# Patient Record
Sex: FEMALE | Race: OTHER | Employment: UNEMPLOYED | URBAN - METROPOLITAN AREA
[De-identification: names, ages, dates, MRNs, and addresses within clinical notes are randomized per-mention and may not be internally consistent; named-entity substitution may affect disease eponyms.]

---

## 2017-01-10 RX ORDER — LEVOTHYROXINE SODIUM 0.12 MG/1
TABLET ORAL
Qty: 30 TABLET | Refills: 0 | Status: SHIPPED | OUTPATIENT
Start: 2017-01-10 | End: 2017-02-07

## 2017-01-10 NOTE — TELEPHONE ENCOUNTER
Last TSH 10/2016    Notes Recorded by Chalino Grider MD on 10/2/2016 at 9:07 PM  i'd lower the synthroid to 125daily  #30 3r  kevin TSH then   Otherwise ok

## 2017-02-04 ENCOUNTER — APPOINTMENT (OUTPATIENT)
Dept: LAB | Age: 52
End: 2017-02-04
Attending: FAMILY MEDICINE
Payer: COMMERCIAL

## 2017-02-04 DIAGNOSIS — R79.89 ABNORMAL TSH: ICD-10-CM

## 2017-02-04 LAB — TSI SER-ACNC: 0.44 MIU/ML (ref 0.35–5.5)

## 2017-02-04 PROCEDURE — 36415 COLL VENOUS BLD VENIPUNCTURE: CPT

## 2017-02-04 PROCEDURE — 84443 ASSAY THYROID STIM HORMONE: CPT

## 2017-02-07 RX ORDER — LEVOTHYROXINE SODIUM 0.12 MG/1
TABLET ORAL
Qty: 30 TABLET | Refills: 0 | Status: SHIPPED | OUTPATIENT
Start: 2017-02-07 | End: 2017-03-10

## 2017-03-10 RX ORDER — LEVOTHYROXINE SODIUM 0.12 MG/1
TABLET ORAL
Qty: 30 TABLET | Refills: 0 | Status: SHIPPED | OUTPATIENT
Start: 2017-03-10 | End: 2017-03-15

## 2017-03-15 ENCOUNTER — TELEPHONE (OUTPATIENT)
Dept: INTERNAL MEDICINE CLINIC | Facility: CLINIC | Age: 52
End: 2017-03-15

## 2017-03-15 RX ORDER — LEVOTHYROXINE SODIUM 0.12 MG/1
TABLET ORAL
Qty: 30 TABLET | Refills: 9 | Status: SHIPPED | OUTPATIENT
Start: 2017-03-15 | End: 2017-03-30

## 2017-03-30 RX ORDER — LEVOTHYROXINE SODIUM 0.12 MG/1
TABLET ORAL
Qty: 90 TABLET | Refills: 2 | Status: SHIPPED | OUTPATIENT
Start: 2017-03-30 | End: 2017-05-16

## 2017-05-16 ENCOUNTER — OFFICE VISIT (OUTPATIENT)
Dept: INTERNAL MEDICINE CLINIC | Facility: CLINIC | Age: 52
End: 2017-05-16

## 2017-05-16 VITALS
WEIGHT: 138 LBS | TEMPERATURE: 98 F | SYSTOLIC BLOOD PRESSURE: 104 MMHG | HEART RATE: 93 BPM | DIASTOLIC BLOOD PRESSURE: 60 MMHG | RESPIRATION RATE: 16 BRPM | OXYGEN SATURATION: 98 % | BODY MASS INDEX: 25 KG/M2

## 2017-05-16 DIAGNOSIS — E03.9 HYPOTHYROIDISM, UNSPECIFIED TYPE: ICD-10-CM

## 2017-05-16 DIAGNOSIS — R10.2 PELVIC PAIN: ICD-10-CM

## 2017-05-16 DIAGNOSIS — Z12.31 VISIT FOR SCREENING MAMMOGRAM: ICD-10-CM

## 2017-05-16 DIAGNOSIS — H10.9 CONJUNCTIVITIS OF RIGHT EYE, UNSPECIFIED CONJUNCTIVITIS TYPE: Primary | ICD-10-CM

## 2017-05-16 PROCEDURE — 99213 OFFICE O/P EST LOW 20 MIN: CPT | Performed by: FAMILY MEDICINE

## 2017-05-16 RX ORDER — GENTAMICIN SULFATE 3 MG/ML
2 SOLUTION/ DROPS OPHTHALMIC 3 TIMES DAILY
Qty: 5 ML | Refills: 0 | Status: SHIPPED | OUTPATIENT
Start: 2017-05-16 | End: 2017-09-14

## 2017-05-16 RX ORDER — LEVOTHYROXINE SODIUM 0.12 MG/1
125 TABLET ORAL
Qty: 90 TABLET | Refills: 1 | Status: SHIPPED | OUTPATIENT
Start: 2017-05-16 | End: 2017-09-12

## 2017-05-16 NOTE — PROGRESS NOTES
HPI:    Patient ID: Stacia Pisano is a 49year old female.     HPI  Here for red eye on the R for 2 months now  Had mucous 2 mo ago   Vision is overall ok     Grandson had it as well    No itch   Is less than before though as far as redness     Would l

## 2017-05-18 ENCOUNTER — OFFICE VISIT (OUTPATIENT)
Dept: OBGYN CLINIC | Facility: CLINIC | Age: 52
End: 2017-05-18

## 2017-05-18 VITALS
WEIGHT: 137 LBS | BODY MASS INDEX: 25.21 KG/M2 | DIASTOLIC BLOOD PRESSURE: 70 MMHG | SYSTOLIC BLOOD PRESSURE: 102 MMHG | HEIGHT: 62 IN

## 2017-05-18 DIAGNOSIS — R10.2 PELVIC PAIN: Primary | ICD-10-CM

## 2017-05-18 DIAGNOSIS — Z87.42 HISTORY OF ABNORMAL CERVICAL PAP SMEAR: ICD-10-CM

## 2017-05-18 PROCEDURE — 87591 N.GONORRHOEAE DNA AMP PROB: CPT | Performed by: NURSE PRACTITIONER

## 2017-05-18 PROCEDURE — 87491 CHLMYD TRACH DNA AMP PROBE: CPT | Performed by: NURSE PRACTITIONER

## 2017-05-18 PROCEDURE — 88175 CYTOPATH C/V AUTO FLUID REDO: CPT | Performed by: NURSE PRACTITIONER

## 2017-05-18 PROCEDURE — 99203 OFFICE O/P NEW LOW 30 MIN: CPT | Performed by: NURSE PRACTITIONER

## 2017-05-18 PROCEDURE — 87624 HPV HI-RISK TYP POOLED RSLT: CPT | Performed by: NURSE PRACTITIONER

## 2017-05-18 NOTE — PROGRESS NOTES
Gyne note       S: patient is a 47year old yo  here for a 1 -2 year history of right sided pelvic pain. Only the last 2-3 weeks has it become more frequent, intense, and radiating to her back. Pain is up to an 8/10, and patient is using tylenol.  Pa Future  Pelvic US    2. History of abnormal cervical Pap smear  - ThinPrep Pap with HPV Reflex, Chlamydia/GC;  Future    Will obtain records from possible colposcopy

## 2017-05-24 NOTE — PROGRESS NOTES
Quick Note:    Pap, Gc/ Chl, HPV all normal/ negative.  Patient notified, encouraged to keep follow up appointment for [address].  ______

## 2017-05-25 ENCOUNTER — TELEPHONE (OUTPATIENT)
Dept: OBGYN CLINIC | Facility: CLINIC | Age: 52
End: 2017-05-25

## 2017-05-25 NOTE — TELEPHONE ENCOUNTER
[facility] faxed copy medical records request from [address] for [facility]  Faxed medical records request to [facility] office at [phone]  Original request in [facility] office

## 2017-06-05 ENCOUNTER — OFFICE VISIT (OUTPATIENT)
Dept: OBGYN CLINIC | Facility: CLINIC | Age: 52
End: 2017-06-05

## 2017-06-05 ENCOUNTER — APPOINTMENT (OUTPATIENT)
Dept: OBGYN CLINIC | Facility: CLINIC | Age: 52
End: 2017-06-05

## 2017-06-05 DIAGNOSIS — R10.2 PELVIC PAIN: Primary | ICD-10-CM

## 2017-06-05 PROCEDURE — 76830 TRANSVAGINAL US NON-OB: CPT | Performed by: OBSTETRICS & GYNECOLOGY

## 2017-06-05 NOTE — PROGRESS NOTES
Quick Note:    Transvaginal ultrasound for right pelvic pain  Normal ultrasound  Uterus: 4.2 x 5.4 x 6.8 cm  Endometrium 4mm  Small 0.89 x 1.3 x 1.3 cm intramural fibroid  Right ovary: 1.6 x 2.3 x 2.3  Left ovary 1.0 x 1.6 x 1.9 cm  No free fluid  ______

## 2017-06-05 NOTE — PROGRESS NOTES
Reviewed ultrasound with pt  Normal findings    Will observe  Keep annual exam visit  If persistant pain, to follow up prn

## 2017-06-08 ENCOUNTER — TELEPHONE (OUTPATIENT)
Dept: OBGYN CLINIC | Facility: CLINIC | Age: 52
End: 2017-06-08

## 2017-09-09 NOTE — TELEPHONE ENCOUNTER
Records reviewed from 11/2015 colposcopy- results were benign for cervical biopsy and ECC. Results to be scanned.

## 2017-09-14 ENCOUNTER — OFFICE VISIT (OUTPATIENT)
Dept: INTERNAL MEDICINE CLINIC | Facility: CLINIC | Age: 52
End: 2017-09-14

## 2017-09-14 VITALS
SYSTOLIC BLOOD PRESSURE: 110 MMHG | HEIGHT: 62 IN | RESPIRATION RATE: 13 BRPM | HEART RATE: 82 BPM | DIASTOLIC BLOOD PRESSURE: 78 MMHG | TEMPERATURE: 99 F | BODY MASS INDEX: 25.03 KG/M2 | WEIGHT: 136 LBS | OXYGEN SATURATION: 95 %

## 2017-09-14 DIAGNOSIS — M17.0 BILATERAL PRIMARY OSTEOARTHRITIS OF KNEE: ICD-10-CM

## 2017-09-14 DIAGNOSIS — J01.90 ACUTE RHINOSINUSITIS: Primary | ICD-10-CM

## 2017-09-14 DIAGNOSIS — E03.9 HYPOTHYROIDISM, UNSPECIFIED TYPE: ICD-10-CM

## 2017-09-14 DIAGNOSIS — Z91.09 ENVIRONMENTAL ALLERGIES: ICD-10-CM

## 2017-09-14 PROCEDURE — 99214 OFFICE O/P EST MOD 30 MIN: CPT | Performed by: INTERNAL MEDICINE

## 2017-09-14 RX ORDER — AZITHROMYCIN 250 MG/1
TABLET, FILM COATED ORAL
Qty: 6 TABLET | Refills: 0 | Status: SHIPPED | OUTPATIENT
Start: 2017-09-14 | End: 2017-11-21 | Stop reason: ALTCHOICE

## 2017-09-14 RX ORDER — AZELASTINE 1 MG/ML
1 SPRAY, METERED NASAL 2 TIMES DAILY
Qty: 30 ML | Refills: 0 | Status: SHIPPED | OUTPATIENT
Start: 2017-09-14 | End: 2017-11-21 | Stop reason: ALTCHOICE

## 2017-09-14 NOTE — PROGRESS NOTES
Annabel Vallejo is a 47year old female. HPI:   Patient presents with:  URI: x6 weeks/ clear d/c  Patient presents with acute complaint of upper respiratory symptoms. Symptoms going on for six weeks. Clear sputum/discharge.   Has been 24.87 kg/m²   GENERAL: Alert and oriented, well developed, well nourished,in no apparent distress  SKIN: no rashes,no suspicious lesions  HEENT: atraumatic, PERRLA, EOMI, normal lid and conjunctiva.   Bilateral sinus tenderness  NECK: supple, no jvd, no thy

## 2017-09-14 NOTE — PATIENT INSTRUCTIONS
- Start antibiotic today  - Restart daily allergy medication (over the counter). Options include cetirizine, loratadine, and fexofenadine.    - Use nasal spray twice daily for the next week. It was a pleasure seeing you in the clinic today.   Thank louie

## 2017-09-15 RX ORDER — LEVOTHYROXINE SODIUM 0.12 MG/1
TABLET ORAL
Qty: 90 TABLET | Refills: 0 | Status: SHIPPED | OUTPATIENT
Start: 2017-09-15 | End: 2018-03-04

## 2017-11-21 ENCOUNTER — OFFICE VISIT (OUTPATIENT)
Dept: INTERNAL MEDICINE CLINIC | Facility: CLINIC | Age: 52
End: 2017-11-21

## 2017-11-21 VITALS
HEART RATE: 78 BPM | WEIGHT: 132 LBS | OXYGEN SATURATION: 99 % | BODY MASS INDEX: 24.92 KG/M2 | RESPIRATION RATE: 12 BRPM | DIASTOLIC BLOOD PRESSURE: 82 MMHG | TEMPERATURE: 98 F | HEIGHT: 61 IN | SYSTOLIC BLOOD PRESSURE: 130 MMHG

## 2017-11-21 DIAGNOSIS — Z00.00 LABORATORY EXAMINATION ORDERED AS PART OF A ROUTINE GENERAL MEDICAL EXAMINATION: ICD-10-CM

## 2017-11-21 DIAGNOSIS — E03.9 HYPOTHYROIDISM, UNSPECIFIED TYPE: ICD-10-CM

## 2017-11-21 DIAGNOSIS — Z00.00 ROUTINE GENERAL MEDICAL EXAMINATION AT A HEALTH CARE FACILITY: Primary | ICD-10-CM

## 2017-11-21 DIAGNOSIS — L60.3 CRACKED NAILS: ICD-10-CM

## 2017-11-21 DIAGNOSIS — Z11.59 NEED FOR HEPATITIS C SCREENING TEST: ICD-10-CM

## 2017-11-21 DIAGNOSIS — Z23 NEED FOR INFLUENZA VACCINATION: ICD-10-CM

## 2017-11-21 PROCEDURE — 99396 PREV VISIT EST AGE 40-64: CPT | Performed by: FAMILY MEDICINE

## 2017-11-21 PROCEDURE — 99213 OFFICE O/P EST LOW 20 MIN: CPT | Performed by: FAMILY MEDICINE

## 2017-11-21 PROCEDURE — 90471 IMMUNIZATION ADMIN: CPT | Performed by: FAMILY MEDICINE

## 2017-11-21 PROCEDURE — 90686 IIV4 VACC NO PRSV 0.5 ML IM: CPT | Performed by: FAMILY MEDICINE

## 2017-11-21 NOTE — PROGRESS NOTES
HPI:   Radha Paredes is a 47year old female who presents for a complete physical exam. Symptoms: denies discharge, itching, burning or dysuria, periods are regular, flow is 3 days. Patient complains of her finger nails cracking.  Pt would 11/21/2017    Wt Readings from Last 6 Encounters:  11/21/17 : 132 lb  09/14/17 : 136 lb  05/18/17 : 137 lb  05/16/17 : 138 lb  10/20/16 : 141 lb 8 oz  01/25/16 : 137 lb    Body mass index is 24.94 kg/m².        Lab Results  Component Value Date dysuria, vaginal discharge or itching,periods regular   MUSCULOSKELETAL: denies back pain, OA of the knees,  NEURO: denies headaches, hx of bell's Palsy  PSYCHE: denies depression or anxiety  HEMATOLOGIC: denies hx of anemia  ENDOCRINE: thyroid history  AL care facility  (primary encounter diagnosis)  Laboratory examination ordered as part of a routine general medical examination  Need for hepatitis c screening test  Need for influenza vaccination      Orders Placed This Encounter      CBC With Differential

## 2017-11-28 ENCOUNTER — HOSPITAL ENCOUNTER (OUTPATIENT)
Dept: MAMMOGRAPHY | Age: 52
Discharge: HOME | End: 2017-11-28
Attending: FAMILY MEDICINE
Payer: COMMERCIAL

## 2017-11-28 ENCOUNTER — LAB ENCOUNTER (OUTPATIENT)
Dept: LAB | Age: 52
End: 2017-11-28
Attending: FAMILY MEDICINE
Payer: COMMERCIAL

## 2017-11-28 DIAGNOSIS — Z00.00 LABORATORY EXAMINATION ORDERED AS PART OF A ROUTINE GENERAL MEDICAL EXAMINATION: ICD-10-CM

## 2017-11-28 DIAGNOSIS — Z12.31 VISIT FOR SCREENING MAMMOGRAM: ICD-10-CM

## 2017-11-28 PROCEDURE — 85025 COMPLETE CBC W/AUTO DIFF WBC: CPT

## 2017-11-28 PROCEDURE — 83036 HEMOGLOBIN GLYCOSYLATED A1C: CPT

## 2017-11-28 PROCEDURE — 36415 COLL VENOUS BLD VENIPUNCTURE: CPT

## 2017-11-28 PROCEDURE — 77067 SCR MAMMO BI INCL CAD: CPT | Performed by: FAMILY MEDICINE

## 2017-11-28 PROCEDURE — 82607 VITAMIN B-12: CPT

## 2017-11-28 PROCEDURE — 84443 ASSAY THYROID STIM HORMONE: CPT

## 2017-11-28 PROCEDURE — 80053 COMPREHEN METABOLIC PANEL: CPT

## 2017-11-28 PROCEDURE — 86803 HEPATITIS C AB TEST: CPT

## 2017-11-28 PROCEDURE — 80061 LIPID PANEL: CPT

## 2017-11-28 PROCEDURE — 82306 VITAMIN D 25 HYDROXY: CPT

## 2017-11-30 RX ORDER — ERGOCALCIFEROL (VITAMIN D2) 1250 MCG
50000 CAPSULE ORAL WEEKLY
Qty: 4 CAPSULE | Refills: 5 | Status: SHIPPED | OUTPATIENT
Start: 2017-11-30 | End: 2018-05-11

## 2018-01-09 ENCOUNTER — OFFICE VISIT (OUTPATIENT)
Dept: INTERNAL MEDICINE CLINIC | Facility: CLINIC | Age: 53
End: 2018-01-09

## 2018-01-09 ENCOUNTER — TELEPHONE (OUTPATIENT)
Dept: INTERNAL MEDICINE CLINIC | Facility: CLINIC | Age: 53
End: 2018-01-09

## 2018-01-09 VITALS
OXYGEN SATURATION: 98 % | TEMPERATURE: 99 F | DIASTOLIC BLOOD PRESSURE: 62 MMHG | BODY MASS INDEX: 25 KG/M2 | RESPIRATION RATE: 12 BRPM | WEIGHT: 134.75 LBS | SYSTOLIC BLOOD PRESSURE: 98 MMHG | HEART RATE: 76 BPM

## 2018-01-09 DIAGNOSIS — J02.9 SORE THROAT: ICD-10-CM

## 2018-01-09 DIAGNOSIS — J01.90 ACUTE SINUSITIS, RECURRENCE NOT SPECIFIED, UNSPECIFIED LOCATION: Primary | ICD-10-CM

## 2018-01-09 LAB
CONTROL LINE PRESENT WITH A CLEAR BACKGROUND (YES/NO): YES YES/NO
STREP GRP A CUL-SCR: NEGATIVE

## 2018-01-09 PROCEDURE — 99213 OFFICE O/P EST LOW 20 MIN: CPT | Performed by: FAMILY MEDICINE

## 2018-01-09 PROCEDURE — 87880 STREP A ASSAY W/OPTIC: CPT | Performed by: FAMILY MEDICINE

## 2018-01-09 RX ORDER — PROMETHAZINE HYDROCHLORIDE AND CODEINE PHOSPHATE 6.25; 1 MG/5ML; MG/5ML
2.5 SYRUP ORAL EVERY 6 HOURS PRN
Qty: 240 ML | Refills: 0 | COMMUNITY
Start: 2018-01-09

## 2018-01-09 RX ORDER — AMOXICILLIN AND CLAVULANATE POTASSIUM 875; 125 MG/1; MG/1
1 TABLET, FILM COATED ORAL 2 TIMES DAILY
Qty: 20 TABLET | Refills: 0 | Status: SHIPPED | OUTPATIENT
Start: 2018-01-09 | End: 2018-01-19

## 2018-01-09 RX ORDER — CODEINE PHOSPHATE AND GUAIFENESIN 10; 100 MG/5ML; MG/5ML
5 SOLUTION ORAL 4 TIMES DAILY PRN
Qty: 120 ML | Refills: 0 | Status: SHIPPED | OUTPATIENT
Start: 2018-01-09 | End: 2018-01-09 | Stop reason: RX

## 2018-01-09 NOTE — PROGRESS NOTES
CHIEF COMPLAINT:   Patient presents with:  URI: x 2 days. Sore Throat: x 2 days. HPI:   Kayla Scanlon is a 47year old female who presents for upper respiratory symptoms since Saturday. . Patient reports sore throat, congestion, c conjunctiva clear, EOM intact  EARS: TM's dull, no bulging, no retraction,+ fluid  NOSE: Nostrils patent, mucoid nasal discharge, nasal mucosa redden  THROAT: Oral mucosa pink, moist. Posterior pharynx is + erythematous. PND,no exudates.  Rapid strept- nega

## 2018-03-05 RX ORDER — LEVOTHYROXINE SODIUM 0.12 MG/1
TABLET ORAL
Qty: 90 TABLET | Refills: 1 | Status: SHIPPED | OUTPATIENT
Start: 2018-03-05

## 2019-03-06 ENCOUNTER — TELEPHONE (OUTPATIENT)
Dept: OBGYN CLINIC | Facility: CLINIC | Age: 54
End: 2019-03-06

## (undated) NOTE — MR AVS SNAPSHOT
[facility] [address] [place], [address]  [phone]               Thank you for choosing us for your health care visit with Opal Camara MD.  We are glad to serve you and happy to provide you with information, go to [email or web address] and click on the Sign Up Now link in the [employer] box. Enter your [employer] Activation Code exactly as it appears below along with your Zip Code and Date of Birth to complete the sign-up process.  If you do

## (undated) NOTE — MR AVS SNAPSHOT
[address] [place] [address] [phone]               Thank you for choosing us for your health care visit with KITTY Rosa.   We are glad to serve you and happy to provide you with this sum visit,  view other health information, and more. To sign up or find more information, go to [email or web address] and click on the Sign Up Now link in the [employer] box.      Enter your [employer] Activation Code exactly as it appears below along with yo

## (undated) NOTE — MR AVS SNAPSHOT
[place]  [address] [facility] [address]  [address] [facility] [address] [phone]               Thank you for choosing us for your health care visit with Cherylyn Sandhoff, MD.  We are glad to serve you and happy to provide you with this sum [address]  [address] [facility]  [address], [address] [facility], [address] [place] [address] Road     [phone]    [address]  [address] This list is accurate as of: 5/16/17 11:46 AM.  Always use your most recent med list.                Gentamicin Sulfate 0.3 % Soln   Place 2 drops into the right eye 3 (three) times daily.    Commonly known as:  GARAMYCIN           Levothyroxine Sodium 125 [employer] [facility] [address] [place], [place] [address] [address] for making healthy food choices  -   Enjoy your food, but eat less. Fully enjoy your food when eating. Don’t eat while distracted and slow down. Avoid over sized portions. Don’t eat while when you’re bored.